# Patient Record
Sex: FEMALE | Race: WHITE | HISPANIC OR LATINO | Employment: FULL TIME | URBAN - METROPOLITAN AREA
[De-identification: names, ages, dates, MRNs, and addresses within clinical notes are randomized per-mention and may not be internally consistent; named-entity substitution may affect disease eponyms.]

---

## 2024-03-07 ENCOUNTER — ON-DEMAND VIRTUAL (OUTPATIENT)
Dept: URGENT CARE | Facility: CLINIC | Age: 39
End: 2024-03-07
Payer: MEDICAID

## 2024-03-07 DIAGNOSIS — J32.9 SINUSITIS, UNSPECIFIED CHRONICITY, UNSPECIFIED LOCATION: Primary | ICD-10-CM

## 2024-03-07 PROCEDURE — 99203 OFFICE O/P NEW LOW 30 MIN: CPT | Mod: 95,,, | Performed by: NURSE PRACTITIONER

## 2024-03-07 RX ORDER — AMOXICILLIN AND CLAVULANATE POTASSIUM 875; 125 MG/1; MG/1
1 TABLET, FILM COATED ORAL EVERY 12 HOURS
Qty: 14 TABLET | Refills: 0 | Status: SHIPPED | OUTPATIENT
Start: 2024-03-07 | End: 2024-03-14

## 2024-03-07 NOTE — PROGRESS NOTES
Subjective:      Patient ID: Rose Mary Calle is a 38 y.o. female.    Vitals:  vitals were not taken for this visit.     Chief Complaint: Sinus Problem      Visit Type: TELE AUDIOVISUAL    Present with the patient at the time of consultation: TELEMED PRESENT WITH PATIENT: None, at home    History reviewed. No pertinent past medical history.  History reviewed. No pertinent surgical history.  Review of patient's allergies indicates:  No Known Allergies  No current outpatient medications on file prior to visit.     No current facility-administered medications on file prior to visit.     History reviewed. No pertinent family history.    Medications Ordered                [id number] - [place], [place] - [address]   [address] [address]    Telephone: [phone]   Fax: [phone]   Hours: Not open 24 hours                         E-Prescribed (1 of 1)              amoxicillin-clavulanate 875-125mg (AUGMENTIN) 875-125 mg per tablet    Sig: Take 1 tablet by mouth every 12 (twelve) hours. for 7 days       Start: 3/7/24     Quantity: 14 tablet Refills: 0                           Ohs Peq Odvv Intake    3/7/2024  7:59 AM CST - Filed by Patient   What is your current physical address in the event of a medical emergency? [address]   Are you able to take your vital signs? No   Please attach any relevant images or files          Prone to sinus infections. 1 week of symptoms. Frontal pain and pressure. No relief with OTC meds.    Sinus Problem  Associated symptoms include congestion, coughing and sinus pressure. Pertinent negatives include no chills, ear pain, shortness of breath or sore throat.       Constitution: Negative for chills and fever.   HENT:  Positive for congestion, sinus pain and sinus pressure. Negative for ear pain, sore throat, trouble swallowing and voice change.    Respiratory:  Positive for cough. Negative for shortness of breath and wheezing.         Objective:   The  physical exam was conducted virtually.  Physical Exam   Constitutional: She is oriented to person, place, and time. She does not appear ill. No distress.   HENT:   Head: Normocephalic and atraumatic.   Nose: Right sinus exhibits frontal sinus tenderness. Left sinus exhibits frontal sinus tenderness.   Eyes: Extraocular movement intact   Pulmonary/Chest: Effort normal.   Abdominal: Normal appearance.   Musculoskeletal: Normal range of motion.         General: Normal range of motion.   Neurological: no focal deficit. She is alert and oriented to person, place, and time.   Psychiatric: Her behavior is normal. Mood normal.   Vitals reviewed.      Assessment:     1. Sinusitis, unspecified chronicity, unspecified location        Plan:   Patient encouraged to monitor symptoms closely and instructed to follow-up for new or worsening symptoms. Further, in-person, evaluation may be necessary for continued treatment. Please follow up with your primary care doctor or specialist as needed. Verbally discussed plan. Patient confirms understanding and is in agreement with treatment and plan.     You must understand that you've received a Virtual Care evaluation only and that you may be released before all your medical problems are known or treated. You, the patient, will arrange for follow up care as instructed.      Sinusitis, unspecified chronicity, unspecified location  -     amoxicillin-clavulanate 875-125mg (AUGMENTIN) 875-125 mg per tablet; Take 1 tablet by mouth every 12 (twelve) hours. for 7 days  Dispense: 14 tablet; Refill: 0      Patient Instructions   OVER THE COUNTER RECOMMENDATIONS/SUGGESTIONS (IF NO CONTRAINDICATIONS).     ·         Make sure to stay well hydrated.     ·         Use Nasal Saline to mechanically move any post nasal drip from your eustachian tube or from the back of your throat.     ·         Use warm saltwater gargles to ease your throat pain. Warm saltwater gargles as needed for sore throat-  1/2 tsp  salt to 1 cup warm water, gargle as desired. Warm fluids tend to relieve a sore throat.     .         Throat lozenges, Chloraseptic spray or other over the counter treatments are ok to use as well. Use as directed.     ·         Use an antihistamine such as Claritin, Zyrtec or Allegra to dry you out.     ·         Use pseudoephedrine (behind the counter) to decongest. Pseudoephedrine  30 mg up to 240 mg /day. It can raise your blood pressure and give you palpitations.     ·         Use Mucinex (guaifenesin) to break up mucous up to 2400mg/day to loosen any mucous.     ·         The Mucinex DM pill has a cough suppressant that can be sedating. It can be used at night to stop the tickle at the back of your throat.     ·         You can use Mucinex D (it has guaifenesin and a high dose of pseudoephedrine) in the mornings to help decongest.     ·         Use Afrin (oxymetazoline) in each nare for no longer than 3 days, as it is addictive. It can also dry out your mucous membranes and cause elevated blood pressure. This is especially useful if you are flying.     ·         Use Flonase 1-2 sprays/nostril per day. It is a local acting steroid nasal spray, if you develop a bloody nose, stop using the medication immediately.     ·         Sometimes Nyquil at night is beneficial to help you get some rest, however it is sedating, and it does have an antihistamine, and Tylenol.     ·         Honey is a natural cough suppressant that can be used.     ·         Tylenol up to 4,000 mg a day is safe for short periods and can be used for body aches, pain, and fever. However, in high doses and prolonged use it can cause liver irritation.     ·         Ibuprofen is a non-steroidal anti-inflammatory that can be used for body aches, pain, and fever. However, it can also cause stomach irritation if overused.

## 2024-03-22 ENCOUNTER — OFFICE VISIT (OUTPATIENT)
Dept: PRIMARY CARE CLINIC | Facility: CLINIC | Age: 39
End: 2024-03-22
Payer: MEDICAID

## 2024-03-22 VITALS
TEMPERATURE: 98 F | BODY MASS INDEX: 34.38 KG/M2 | HEIGHT: 63 IN | DIASTOLIC BLOOD PRESSURE: 91 MMHG | HEART RATE: 101 BPM | WEIGHT: 194 LBS | RESPIRATION RATE: 18 BRPM | OXYGEN SATURATION: 97 % | SYSTOLIC BLOOD PRESSURE: 123 MMHG

## 2024-03-22 DIAGNOSIS — Z01.419 WELL WOMAN EXAM WITH ROUTINE GYNECOLOGICAL EXAM: ICD-10-CM

## 2024-03-22 DIAGNOSIS — Z01.00 ROUTINE EYE EXAM: ICD-10-CM

## 2024-03-22 DIAGNOSIS — Z97.3 WEARS GLASSES: ICD-10-CM

## 2024-03-22 DIAGNOSIS — Z13.220 SCREENING CHOLESTEROL LEVEL: ICD-10-CM

## 2024-03-22 DIAGNOSIS — Z11.59 ENCOUNTER FOR HEPATITIS C SCREENING TEST FOR LOW RISK PATIENT: ICD-10-CM

## 2024-03-22 DIAGNOSIS — Z00.00 ADULT GENERAL MEDICAL EXAM: ICD-10-CM

## 2024-03-22 DIAGNOSIS — G43.909 MIGRAINE WITHOUT STATUS MIGRAINOSUS, NOT INTRACTABLE, UNSPECIFIED MIGRAINE TYPE: ICD-10-CM

## 2024-03-22 DIAGNOSIS — R03.0 ELEVATED BLOOD PRESSURE READING WITHOUT DIAGNOSIS OF HYPERTENSION: ICD-10-CM

## 2024-03-22 DIAGNOSIS — Z13.29 SCREENING FOR THYROID DISORDER: ICD-10-CM

## 2024-03-22 DIAGNOSIS — Z11.3 ROUTINE SCREENING FOR STI (SEXUALLY TRANSMITTED INFECTION): ICD-10-CM

## 2024-03-22 DIAGNOSIS — D22.9 MULTIPLE ATYPICAL SKIN MOLES: ICD-10-CM

## 2024-03-22 DIAGNOSIS — L91.8 SKIN TAGS, MULTIPLE ACQUIRED: Primary | ICD-10-CM

## 2024-03-22 DIAGNOSIS — E66.09 CLASS 1 OBESITY DUE TO EXCESS CALORIES WITHOUT SERIOUS COMORBIDITY WITH BODY MASS INDEX (BMI) OF 34.0 TO 34.9 IN ADULT: ICD-10-CM

## 2024-03-22 DIAGNOSIS — Z13.1 SCREENING FOR DIABETES MELLITUS: ICD-10-CM

## 2024-03-22 PROCEDURE — 99999 PR PBB SHADOW E&M-EST. PATIENT-LVL V: CPT | Mod: PBBFAC,,, | Performed by: NURSE PRACTITIONER

## 2024-03-22 PROCEDURE — 3080F DIAST BP >= 90 MM HG: CPT | Mod: CPTII,,, | Performed by: NURSE PRACTITIONER

## 2024-03-22 PROCEDURE — 1159F MED LIST DOCD IN RCRD: CPT | Mod: CPTII,,, | Performed by: NURSE PRACTITIONER

## 2024-03-22 PROCEDURE — 3008F BODY MASS INDEX DOCD: CPT | Mod: CPTII,,, | Performed by: NURSE PRACTITIONER

## 2024-03-22 PROCEDURE — 3074F SYST BP LT 130 MM HG: CPT | Mod: CPTII,,, | Performed by: NURSE PRACTITIONER

## 2024-03-22 PROCEDURE — 99215 OFFICE O/P EST HI 40 MIN: CPT | Mod: PBBFAC,PN | Performed by: NURSE PRACTITIONER

## 2024-03-22 PROCEDURE — 1160F RVW MEDS BY RX/DR IN RCRD: CPT | Mod: CPTII,,, | Performed by: NURSE PRACTITIONER

## 2024-03-22 PROCEDURE — 99215 OFFICE O/P EST HI 40 MIN: CPT | Mod: S$PBB,,, | Performed by: NURSE PRACTITIONER

## 2024-03-22 NOTE — PROGRESS NOTES
"Subjective:       Patient ID: Rose Mary Calle is a 38 y.o. female.    Chief Complaint: Gen Med Exam    Ms. Rose Mary Calle is a 38 year old female, new to me, presents to the clinic today for general health examination. No PCP. Medical and surgical history in addition to problem list reviewed as listed below.      Recently relocated from [place] one month ago.     LMP 03/20/2024, irregular. Currently breastfeeding.        Past Medical History:   Diagnosis Date    Migraines         History reviewed. No pertinent surgical history.     Family History   Problem Relation Age of Onset    Diabetes Father     Hypertension Father     Heart disease Paternal Grandfather        Social History     Tobacco Use   Smoking Status Never   Smokeless Tobacco Never       Social History     Social History Narrative    Not on file       Review of patient's allergies indicates:  No Known Allergies     Review of Systems   Constitutional: Negative.    HENT: Negative.     Eyes:  Negative for photophobia, discharge and itching.        Wears glasses   Respiratory: Negative.     Cardiovascular: Negative.    Gastrointestinal: Negative.    Endocrine: Negative.    Genitourinary: Negative.    Musculoskeletal: Negative.    Skin:         Moles and skin tags   Neurological: Negative.    Hematological: Negative.    Psychiatric/Behavioral:  The patient is nervous/anxious.          Objective:     Vitals:    03/22/24 1309   BP: (!) 123/91   BP Location: Left arm   Patient Position: Sitting   BP Method: Small (Automatic)   Pulse: 101   Resp: 18   Temp: 98.1 °F (36.7 °C)   TempSrc: Oral   SpO2: 97%   Weight: 88 kg (194 lb 0.1 oz)   Height: 5' 3" (1.6 m)        Physical Exam  Constitutional:       Appearance: Normal appearance.   HENT:      Head: Normocephalic and atraumatic.   Skin:     General: Skin is warm and dry.      Capillary Refill: Capillary refill takes less than 2 seconds.      Comments: Multiple moles  and skin tags to upper extremities and " torso.   Neurological:      Mental Status: She is alert and oriented to person, place, and time.   Psychiatric:         Mood and Affect: Mood is anxious.         Speech: Speech normal.         Behavior: Behavior is cooperative.         Cognition and Memory: Cognition normal.         Assessment:       1. Skin tags, multiple acquired    2. Multiple atypical skin moles    3. Migraine without status migrainosus, not intractable, unspecified migraine type    4. Adult general medical exam    5. Well woman exam with routine gynecological exam    6. BMI 34.0-34.9,adult    7. Elevated blood pressure reading without diagnosis of hypertension    8. Screening cholesterol level    9. Encounter for hepatitis C screening test for low risk patient    10. Screening for diabetes mellitus    11. Screening for thyroid disorder    12. Routine screening for STI (sexually transmitted infection)    13. Wears glasses    14. Routine eye exam        Plan:       Adult general medical exam  -     CBC Auto Differential; Future; Expected date: 03/22/2024  -     Comprehensive Metabolic Panel; Future; Expected date: 03/22/2024    Well woman exam with routine gynecological exam  -     Ambulatory referral/consult to Gynecology; Future; Expected date: 03/22/2024    Class 1 obesity due to excess calories without serious comorbidity with body mass index (BMI) of 34.0 to 34.9 in adult   Recommend Dash/Mediterranean diet, exercise 3 times a week for 30 minute intervals, increase as tolerated.      Screening cholesterol level  -     Lipid Panel; Future; Expected date: 03/22/2024    Encounter for hepatitis C screening test for low risk patient  -     Hepatitis C Antibody; Future; Expected date: 03/22/2024    Screening for diabetes mellitus  -     Hemoglobin A1C; Future; Expected date: 03/22/2024    Screening for thyroid disorder  -     T4, Free; Future; Expected date: 03/22/2024  -     TSH; Future; Expected date: 03/22/2024    Routine screening for STI  (sexually transmitted infection)  -     HIV 1/2 Ag/Ab (4th Gen); Future; Expected date: 03/22/2024    Wears glasses  Has not been see by ophthalmologist in years.  -     Ambulatory referral/consult to Ophthalmology; Future; Expected date: 03/22/2024    Routine eye exam  -     Ambulatory referral/consult to Ophthalmology; Future; Expected date: 03/22/2024    Multiple atypical skin moles  Posterior trunk.  -     Ambulatory referral/consult to Dermatology; Future; Expected date: 03/22/2024    Skin tags, multiple acquired  Anterior and Posterior.  -     Ambulatory referral/consult to Dermatology; Future; Expected date: 03/22/2024     Follow up if symptoms worsen or fail to improve.    I spent a total of 50 minutes on the day of the visit.This includes face to face time and non-face to face time preparing to see the patient (eg, review of tests), obtaining and/or reviewing separately obtained history, documenting clinical information in the electronic or other health record, independently interpreting results and communicating results to the patient/family/caregiver, or care coordinator.     Jeannette Rojas, APRN, MSN, FNP-C

## 2024-07-13 ENCOUNTER — ON-DEMAND VIRTUAL (OUTPATIENT)
Dept: URGENT CARE | Facility: CLINIC | Age: 39
End: 2024-07-13
Payer: MEDICAID

## 2024-07-13 DIAGNOSIS — H57.11 ACUTE RIGHT EYE PAIN: ICD-10-CM

## 2024-07-13 DIAGNOSIS — H10.31 ACUTE BACTERIAL CONJUNCTIVITIS OF RIGHT EYE: Primary | ICD-10-CM

## 2024-07-13 PROCEDURE — 99213 OFFICE O/P EST LOW 20 MIN: CPT | Mod: 95,S$GLB,, | Performed by: STUDENT IN AN ORGANIZED HEALTH CARE EDUCATION/TRAINING PROGRAM

## 2024-07-13 RX ORDER — POLYMYXIN B SULFATE AND TRIMETHOPRIM 1; 10000 MG/ML; [USP'U]/ML
1 SOLUTION OPHTHALMIC 4 TIMES DAILY
Qty: 10 ML | Refills: 0 | Status: SHIPPED | OUTPATIENT
Start: 2024-07-13 | End: 2024-07-18

## 2024-07-13 NOTE — PROGRESS NOTES
Subjective:      Patient ID: Rose Mary Calle is a 39 y.o. female.    Vitals:  vitals were not taken for this visit.     Chief Complaint: Eye Problem      Visit Type: TELE AUDIOVISUAL    Present with the patient at the time of consultation: TELEMED PRESENT WITH PATIENT: None    Past Medical History:   Diagnosis Date    Migraines      History reviewed. No pertinent surgical history.  Review of patient's allergies indicates:  No Known Allergies  No current outpatient medications on file prior to visit.     No current facility-administered medications on file prior to visit.     Family History   Problem Relation Name Age of Onset    Diabetes Father      Hypertension Father      Heart disease Paternal Grandfather         Medications Ordered                [id number] - [place] - [address]   [address] [place] [address]    Telephone: [phone]   Fax: [phone]   Hours: Not open 24 hours                         E-Prescribed (1 of 1)              polymyxin B sulf-trimethoprim (POLYTRIM) 10,000 unit- 1 mg/mL Drop    Sig: Place 1 drop into the right eye 4 (four) times daily. for 5 days       Start: 7/13/24     Quantity: 10 mL Refills: 0                           Ohs Peq Odvv Intake    7/13/2024 12:59 PM CDT - Filed by Patient   What is your current physical address in the event of a medical emergency? [address]   Are you able to take your vital signs? No   Please attach any relevant images or files          Right eye pain since yesterday, woke up with thick crust on eye  She put some old eye shadow on her eyes  Not sure what color the crust is  Kids dont have pink eye, no allergies    The patient location is: [place], [place]  The chief complaint leading to consultation is: eye pain    Visit type: audiovisual    Face to Face time with patient: 7 minutes         Eye Problem   The right eye is affected. The current episode started yesterday. The pain is at a severity of 7/10. The pain is  moderate. There is No known exposure to pink eye. She Does not wear contacts. Associated symptoms include an eye discharge. Pertinent negatives include no blurred vision, eye redness, foreign body sensation or itching. She has tried water for the symptoms.       Eyes:  Positive for eye discharge. Negative for eye itching, eye redness and blurred vision.        Objective:   The physical exam was conducted virtually.  Physical Exam   Eyes: Lids are normal. Right conjunctiva is injected.       Assessment:     1. Acute bacterial conjunctivitis of right eye    2. Acute right eye pain        Plan:       Acute bacterial conjunctivitis of right eye  -     polymyxin B sulf-trimethoprim (POLYTRIM) 10,000 unit- 1 mg/mL Drop; Place 1 drop into the right eye 4 (four) times daily. for 5 days  Dispense: 10 mL; Refill: 0    Acute right eye pain  -     polymyxin B sulf-trimethoprim (POLYTRIM) 10,000 unit- 1 mg/mL Drop; Place 1 drop into the right eye 4 (four) times daily. for 5 days  Dispense: 10 mL; Refill: 0    -handout was sent to the patient    18 minutes of total time spent on the encounter, which includes face to face time and non-face to face time preparing to see the patient (eg, review of tests), Obtaining and/or reviewing separately obtained history, Documenting clinical information in the electronic or other health record, Independently interpreting results (not separately reported) and communicating results to the patient/family/caregiver, or Care coordination (not separately reported).         Each patient to whom he or she provides medical services by telemedicine is:  (1) informed of the relationship between the physician and patient and the respective role of any other health care provider with respect to management of the patient; and (2) notified that he or she may decline to receive medical services by telemedicine and may withdraw from such care at any time.    Notes:   Medical Decision Making:   Differential  Diagnosis:   Acute bacterial conjunctivitis of the right eye vs allergic reaction to make up products  Urgent Care Management:  Pt has two days of right eye pain and discharge after accidentally using  makeup products.   Pt was sent home with abx eye drops. ED and return precautions given.

## 2024-07-13 NOTE — PATIENT INSTRUCTIONS
Please follow up with your primary care provider within 2-5 days if your signs and symptoms have not resolved or worsen.     If your condition worsens or fails to improve we recommend that you receive another evaluation at the emergency room immediately or contact your primary medical clinic to discuss your concerns.   You must understand that you have received an Urgent Care treatment only and that you may be released before all of your medical problems are known or treated. You, the patient, will arrange for follow up care as instructed.        If the eye drops do not work then please consider following up with your PCP or going to to an urgent care. If the infection is not bacterial then please consider an allergic reaction to the makeup. You may require an antihistamine or steroid eye drop.